# Patient Record
Sex: FEMALE | Race: WHITE | NOT HISPANIC OR LATINO | URBAN - METROPOLITAN AREA
[De-identification: names, ages, dates, MRNs, and addresses within clinical notes are randomized per-mention and may not be internally consistent; named-entity substitution may affect disease eponyms.]

---

## 2024-09-28 ENCOUNTER — OFFICE VISIT (OUTPATIENT)
Dept: URGENT CARE | Age: 69
End: 2024-09-28
Payer: MEDICARE

## 2024-09-28 VITALS
HEART RATE: 99 BPM | DIASTOLIC BLOOD PRESSURE: 61 MMHG | HEIGHT: 69 IN | BODY MASS INDEX: 23.7 KG/M2 | TEMPERATURE: 98.7 F | WEIGHT: 160 LBS | SYSTOLIC BLOOD PRESSURE: 94 MMHG | OXYGEN SATURATION: 96 %

## 2024-09-28 DIAGNOSIS — R05.1 ACUTE COUGH: ICD-10-CM

## 2024-09-28 DIAGNOSIS — U07.1 COVID: Primary | ICD-10-CM

## 2024-09-28 LAB
POC RAPID INFLUENZA A: NEGATIVE
POC RAPID INFLUENZA B: NEGATIVE
POC SARS-COV-2 AG BINAX: ABNORMAL

## 2024-09-28 RX ORDER — CETIRIZINE HYDROCHLORIDE 10 MG/1
10 TABLET ORAL DAILY
COMMUNITY

## 2024-09-28 NOTE — PROGRESS NOTES
Subjective   Patient ID: Bárbara Sullivan is a 69 y.o. female. They present today with a chief complaint of Request For Covid Testing (Flu testing as well. Symptoms started 2 days ago. ), Sore Throat, Cough, and Nasal Congestion.    History of Present Illness  HPI  Patient presents to the urgent care with a 2-day history of sore throat, sinus congestion, cough.  Patient states she received her COVID and flu vaccine 1 week ago.  Patient has never tested positive for COVID.  Patient reports being at multiple social gatherings over the past week, denies any known exposure to COVID.  Patient states her symptoms feel like the common cold.  Past Medical History  Allergies as of 09/28/2024    (No Known Allergies)       (Not in a hospital admission)       Past Medical History:   Diagnosis Date    Chronic rhinitis     Rhinitis    Encounter for gynecological examination (general) (routine) without abnormal findings 12/07/2016    Encounter for routine gynecological examination    Encounter for other screening for malignant neoplasm of breast 12/07/2016    Encounter for screening breast examination    Personal history of other diseases of the digestive system     History of esophageal reflux    Personal history of other diseases of the musculoskeletal system and connective tissue     History of arthritis       Past Surgical History:   Procedure Laterality Date    BREAST BIOPSY  10/15/2014    Biopsy Breast Open    MOUTH SURGERY  10/15/2014    Oral Surgery Tooth Extraction    TOTAL ABDOMINAL HYSTERECTOMY W/ BILATERAL SALPINGOOPHORECTOMY  10/15/2014    Total Abdominal Hysterectomy With Removal Of Both Ovaries        reports that she has never smoked. She has never been exposed to tobacco smoke. She has never used smokeless tobacco. She reports current alcohol use. She reports that she does not use drugs.    Review of Systems  Review of Systems     Patient denies fever, nausea, diarrhea, vomiting, rash, dizziness, shortness of breath,  "increased urinary frequency, chills, peripheral edema, abdominal pain, chest pain.                          Objective    Vitals:    09/28/24 1103   BP: 94/61   BP Location: Left arm   Patient Position: Sitting   BP Cuff Size: Adult   Pulse: 99   Temp: 37.1 °C (98.7 °F)   TempSrc: Oral   SpO2: 96%   Weight: 72.6 kg (160 lb)   Height: 1.753 m (5' 9\")     No LMP recorded (lmp unknown). Patient is postmenopausal.    Physical Exam  Appearance: Alert, oriented, cooperative, in no acute distress. Well nourished & well hydrated.    Skin: Intact, no lesions, rash, petechiae or purpura.     Eyes: Conjunctiva pink with no redness or exudates. Eyelids without lesions. No scleral icterus.     ENT: Hearing grossly intact. External inspection of ears without lesions or erythema. no nasal flaring. no tripoding, no drooling, no difficulty swallowing oral secretions.    Pulmonary: Clear bilaterally with good chest wall excursion. No rales, rhonchi or wheezing. No accessory muscle use or stridor. No difficulty completing a full sentence without taking a breath, no labored breathing w ambulation     Cardiac: Normal S1, S2, no rub, gallop. No JVD.    Musculoskeletal: no edema, or deformity. No cyanosis, clubbing.    Neurological: no focal findings identified.    Psychiatric: Appropriate mood and affect.    Procedures    Point of Care Test & Imaging Results from this visit  Results for orders placed or performed in visit on 09/28/24   POCT Influenza A/B manually resulted   Result Value Ref Range    POC Rapid Influenza A Negative Negative    POC Rapid Influenza B Negative Negative   POCT Covid-19 Rapid Antigen   Result Value Ref Range    POC AIDAN-COV-2 AG Positive test for SARS-CoV-2 (antigen detected) (A) Presumptive negative test for SARS-CoV-2 (no antigen detected)      No results found.    Diagnostic study results (if any) were reviewed by Leah Adorno PA-C.    Assessment/Plan   Allergies, medications, history, and pertinent " labs/EKGs/Imaging reviewed by Leah Adorno PA-C.     Medical Decision Making  Considerations for patient's symptoms include viral/bacterial infection, seasonal allergies, post-nasal drip. VSS, afebrile  Rapid COVID test positive  Rapid flu test negative  Patient will continue symptomatic treatment, discussed quarantine guidelines. If symptoms are not improving or if they are worsening the patient will call their primary care physician and go to the ER. Patient verbalizes understanding and agrees with plan of care. All questions were answered.  Dictation software was used in the creation of this note which does not evaluate or correct for typographical, spelling, syntax or grammatical errors.     Orders and Diagnoses  Diagnoses and all orders for this visit:  Acute cough  -     POCT Influenza A/B manually resulted  -     POCT Covid-19 Rapid Antigen      Medical Admin Record      Patient disposition: Home    Electronically signed by Leah Adorno PA-C  11:16 AM

## 2024-11-26 ENCOUNTER — OFFICE VISIT (OUTPATIENT)
Dept: URGENT CARE | Age: 69
End: 2024-11-26
Payer: MEDICARE

## 2024-11-26 VITALS
HEART RATE: 83 BPM | OXYGEN SATURATION: 95 % | WEIGHT: 160 LBS | DIASTOLIC BLOOD PRESSURE: 65 MMHG | SYSTOLIC BLOOD PRESSURE: 101 MMHG | HEIGHT: 69 IN | TEMPERATURE: 97.7 F | BODY MASS INDEX: 23.7 KG/M2

## 2024-11-26 DIAGNOSIS — R09.81 NASAL CONGESTION: ICD-10-CM

## 2024-11-26 DIAGNOSIS — J01.00 ACUTE NON-RECURRENT MAXILLARY SINUSITIS: Primary | ICD-10-CM

## 2024-11-26 LAB — POC SARS-COV-2 AG BINAX: NORMAL

## 2024-11-26 RX ORDER — AMOXICILLIN AND CLAVULANATE POTASSIUM 875; 125 MG/1; MG/1
1 TABLET, FILM COATED ORAL 2 TIMES DAILY
Qty: 20 TABLET | Refills: 0 | Status: SHIPPED | OUTPATIENT
Start: 2024-11-26 | End: 2024-12-06

## 2024-11-26 ASSESSMENT — ENCOUNTER SYMPTOMS
EYES NEGATIVE: 1
CONSTITUTIONAL NEGATIVE: 1
COUGH: 1
SINUS PRESSURE: 1
SINUS PAIN: 1

## 2024-11-26 NOTE — PROGRESS NOTES
"Subjective   Patient ID: Bárbara Sullivan is a 69 y.o. female. They present today with a chief complaint of Nasal Congestion (Symptoms for about a week.) and Sinus Problem.    History of Present Illness  HPI    Past Medical History  Allergies as of 11/26/2024    (No Known Allergies)       (Not in a hospital admission)       Past Medical History:   Diagnosis Date    Chronic rhinitis     Rhinitis    Encounter for gynecological examination (general) (routine) without abnormal findings 12/07/2016    Encounter for routine gynecological examination    Encounter for other screening for malignant neoplasm of breast 12/07/2016    Encounter for screening breast examination    Personal history of other diseases of the digestive system     History of esophageal reflux    Personal history of other diseases of the musculoskeletal system and connective tissue     History of arthritis       Past Surgical History:   Procedure Laterality Date    BREAST BIOPSY  10/15/2014    Biopsy Breast Open    MOUTH SURGERY  10/15/2014    Oral Surgery Tooth Extraction    TOTAL ABDOMINAL HYSTERECTOMY W/ BILATERAL SALPINGOOPHORECTOMY  10/15/2014    Total Abdominal Hysterectomy With Removal Of Both Ovaries        reports that she has never smoked. She has never been exposed to tobacco smoke. She has never used smokeless tobacco. She reports current alcohol use. She reports that she does not use drugs.    Review of Systems  Review of Systems   Constitutional: Negative.    HENT:  Positive for congestion, sinus pressure and sinus pain.    Eyes: Negative.    Respiratory:  Positive for cough.                                   Objective    Vitals:    11/26/24 1424   BP: 101/65   BP Location: Right arm   Patient Position: Sitting   BP Cuff Size: Adult   Pulse: 83   Temp: 36.5 °C (97.7 °F)   TempSrc: Oral   SpO2: 95%   Weight: 72.6 kg (160 lb)   Height: 1.753 m (5' 9\")     No LMP recorded (lmp unknown). Patient is postmenopausal.    Physical Exam  Constitutional: "       Appearance: Normal appearance.   HENT:      Right Ear: Tympanic membrane normal.      Left Ear: Tympanic membrane is bulging.      Nose:      Right Sinus: Maxillary sinus tenderness present.      Mouth/Throat:      Pharynx: Posterior oropharyngeal erythema and postnasal drip present.   Pulmonary:      Effort: Pulmonary effort is normal.      Breath sounds: Normal breath sounds and air entry.   Neurological:      Mental Status: She is alert.         Procedures    Point of Care Test & Imaging Results from this visit  Results for orders placed or performed in visit on 11/26/24   POCT Covid-19 Rapid Antigen   Result Value Ref Range    POC AIDAN-COV-2 AG  Presumptive negative test for SARS-CoV-2 (no antigen detected)     Presumptive negative test for SARS-CoV-2 (no antigen detected)      No results found.    Diagnostic study results (if any) were reviewed by Sahara Aguilera MD.    Assessment/Plan   Allergies, medications, history, and pertinent labs/EKGs/Imaging reviewed by Sahara Aguilera MD.     Medical Decision Making      Orders and Diagnoses  Diagnoses and all orders for this visit:  Nasal congestion  -     POCT Covid-19 Rapid Antigen      Medical Admin Record      Patient disposition: Home    Electronically signed by Sahara Aguilera MD  2:49 PM

## 2025-01-27 ENCOUNTER — OFFICE VISIT (OUTPATIENT)
Dept: URGENT CARE | Age: 70
End: 2025-01-27
Payer: MEDICARE

## 2025-01-27 VITALS
WEIGHT: 160 LBS | HEART RATE: 77 BPM | DIASTOLIC BLOOD PRESSURE: 68 MMHG | TEMPERATURE: 98.3 F | OXYGEN SATURATION: 97 % | BODY MASS INDEX: 23.63 KG/M2 | SYSTOLIC BLOOD PRESSURE: 105 MMHG | RESPIRATION RATE: 16 BRPM

## 2025-01-27 DIAGNOSIS — J01.00 ACUTE NON-RECURRENT MAXILLARY SINUSITIS: Primary | ICD-10-CM

## 2025-01-27 PROCEDURE — 1125F AMNT PAIN NOTED PAIN PRSNT: CPT | Performed by: NURSE PRACTITIONER

## 2025-01-27 PROCEDURE — 99070 SPECIAL SUPPLIES PHYS/QHP: CPT | Performed by: NURSE PRACTITIONER

## 2025-01-27 PROCEDURE — 99213 OFFICE O/P EST LOW 20 MIN: CPT | Performed by: NURSE PRACTITIONER

## 2025-01-27 RX ORDER — AMOXICILLIN AND CLAVULANATE POTASSIUM 875; 125 MG/1; MG/1
875 TABLET, FILM COATED ORAL 2 TIMES DAILY
Qty: 20 TABLET | Refills: 0 | Status: SHIPPED | OUTPATIENT
Start: 2025-01-27

## 2025-01-27 ASSESSMENT — ENCOUNTER SYMPTOMS
SINUS PAIN: 1
SINUS PRESSURE: 1

## 2025-01-27 ASSESSMENT — PAIN SCALES - GENERAL: PAINLEVEL_OUTOF10: 4

## 2025-01-27 NOTE — PROGRESS NOTES
Subjective   Patient ID: Bárbara Sullivan is a 69 y.o. female. They present today with a chief complaint of Sinus Problem (Sinus pressure under eye and in head x 1 week), Cough (Post nasal drip x 1 week), and Fatigue.    History of Present Illness  Bárbara Sullivan is a 69 y.o. female who presents to the clinic for 7 days of sinus pressure, sinus pain, right ear fullness.  Patient states she has taken Zicam, Mucinex D with no relief..   Pt denies any chest pain, sob, N/V at this time in clinic.             Past Medical History  Allergies as of 01/27/2025    (No Known Allergies)       (Not in a hospital admission)       Past Medical History:   Diagnosis Date    Chronic rhinitis     Rhinitis    Encounter for gynecological examination (general) (routine) without abnormal findings 12/07/2016    Encounter for routine gynecological examination    Encounter for other screening for malignant neoplasm of breast 12/07/2016    Encounter for screening breast examination    Personal history of other diseases of the digestive system     History of esophageal reflux    Personal history of other diseases of the musculoskeletal system and connective tissue     History of arthritis       Past Surgical History:   Procedure Laterality Date    BREAST BIOPSY  10/15/2014    Biopsy Breast Open    MOUTH SURGERY  10/15/2014    Oral Surgery Tooth Extraction    TOTAL ABDOMINAL HYSTERECTOMY W/ BILATERAL SALPINGOOPHORECTOMY  10/15/2014    Total Abdominal Hysterectomy With Removal Of Both Ovaries        reports that she has never smoked. She has never been exposed to tobacco smoke. She has never used smokeless tobacco. She reports current alcohol use. She reports that she does not use drugs.    Review of Systems  Review of Systems   HENT:  Positive for congestion, ear pain, sinus pressure and sinus pain.    All other systems reviewed and are negative.                                 Objective    Vitals:    01/27/25 1222   BP: 105/68   Pulse: 77   Resp:  16   Temp: 36.8 °C (98.3 °F)   TempSrc: Oral   SpO2: 97%   Weight: 72.6 kg (160 lb)     No LMP recorded (lmp unknown). Patient is postmenopausal.    Physical Exam  Constitutional:       Appearance: Normal appearance.   HENT:      Right Ear: Tympanic membrane normal.      Left Ear: Tympanic membrane normal.      Nose:      Right Sinus: Maxillary sinus tenderness and frontal sinus tenderness present.      Left Sinus: No maxillary sinus tenderness or frontal sinus tenderness.   Cardiovascular:      Rate and Rhythm: Normal rate and regular rhythm.   Pulmonary:      Effort: Pulmonary effort is normal.      Breath sounds: Normal breath sounds.   Neurological:      General: No focal deficit present.      Mental Status: She is alert and oriented to person, place, and time. Mental status is at baseline.   Psychiatric:         Mood and Affect: Mood normal.         Behavior: Behavior normal.         Procedures    Point of Care Test & Imaging Results from this visit  No results found for this visit on 01/27/25.   No results found.    Diagnostic study results (if any) were reviewed by BRIA Gonzalez.    Assessment/Plan   Allergies, medications, history, and pertinent labs/EKGs/Imaging reviewed by ABDIAZIZ Gonzalez-CNP.     Medical Decision Making  History and examination consistent with acute uncomplicated sinusitis. No indication for labs or imaging at this time. No evidence of sepsis, strep pharyngitis, or pneumonia. Counseled patient/family on antibiotic treatment and supportive measures at home. Patient advised to return to clinic or present to ED if symptoms change or worsen. Otherwise follow-up with PCP. Patient verbalized understanding and agrees with plan.       Orders and Diagnoses  Diagnoses and all orders for this visit:  Acute non-recurrent maxillary sinusitis  -     amoxicillin-pot clavulanate (Augmentin) 875-125 mg tablet; Take 1 tablet (875 mg) by mouth 2 times a day.      Medical Admin  Record      Patient disposition: Home    Electronically signed by Braulio Neil, APRN-CNP  12:51 PM

## 2025-06-15 ENCOUNTER — OFFICE VISIT (OUTPATIENT)
Dept: URGENT CARE | Age: 70
End: 2025-06-15
Payer: MEDICARE

## 2025-06-15 VITALS
SYSTOLIC BLOOD PRESSURE: 121 MMHG | OXYGEN SATURATION: 96 % | BODY MASS INDEX: 23.7 KG/M2 | TEMPERATURE: 97.8 F | DIASTOLIC BLOOD PRESSURE: 71 MMHG | RESPIRATION RATE: 17 BRPM | HEART RATE: 85 BPM | HEIGHT: 69 IN | WEIGHT: 160 LBS

## 2025-06-15 DIAGNOSIS — J01.90 ACUTE BACTERIAL SINUSITIS: Primary | ICD-10-CM

## 2025-06-15 DIAGNOSIS — B96.89 ACUTE BACTERIAL SINUSITIS: Primary | ICD-10-CM

## 2025-06-15 RX ORDER — AMOXICILLIN AND CLAVULANATE POTASSIUM 875; 125 MG/1; MG/1
875 TABLET, FILM COATED ORAL 2 TIMES DAILY
Qty: 20 TABLET | Refills: 0 | Status: SHIPPED | OUTPATIENT
Start: 2025-06-15

## 2025-06-15 ASSESSMENT — ENCOUNTER SYMPTOMS
COUGH: 1
LOSS OF SENSATION IN FEET: 0
RHINORRHEA: 1
SINUS PAIN: 1
DEPRESSION: 0
FATIGUE: 1
OCCASIONAL FEELINGS OF UNSTEADINESS: 0
SINUS PRESSURE: 1

## 2025-06-15 ASSESSMENT — PATIENT HEALTH QUESTIONNAIRE - PHQ9
1. LITTLE INTEREST OR PLEASURE IN DOING THINGS: NOT AT ALL
SUM OF ALL RESPONSES TO PHQ9 QUESTIONS 1 AND 2: 0
2. FEELING DOWN, DEPRESSED OR HOPELESS: NOT AT ALL

## 2025-06-15 NOTE — PATIENT INSTRUCTIONS
Augmentin 1 tablet 2 times a day for 10 days.  Please take over-the-counter Zyrtec.  Please take as directed on label.  Please take for 10 days.  Please take over-the-counter probiotic tablet or increase yogurt consumption for the next 10 days.  Please call your primary care doctor next 5 to 7 days.  If worsening symptoms, please go to local emergency department for further evaluation.    Please follow up with your primary provider within one week if symptoms do not improve.  You may schedule an appointment online at [email or web address] or call [phone]. Go to the Emergency Department if symptoms significantly worsen or if you develop chest pain or shortness of breath.

## 2025-06-15 NOTE — PROGRESS NOTES
"Subjective   Patient ID: Bárbara Sullivan is a 69 y.o. female. They present today with a chief complaint of Sinusitis (Possible sinus infection, sore throat, headache ongoing for 8 days not getting better).    History of Present Illness  Bárbara Sullivan is a 69 y.o. female who presents to the clinic for 8 days of sinus pressure, sinus pain, intermittent cough, rhinorrhea, postnasal drip, sore throat, fatigue. Pt denies any chest pain, sob, N/V at this time in clinic.             Past Medical History  Allergies as of 06/15/2025    (No Known Allergies)       Prescriptions Prior to Admission[1]     Medical History[2]    Surgical History[3]     reports that she has never smoked. She has never been exposed to tobacco smoke. She has never used smokeless tobacco. She reports current alcohol use. She reports that she does not use drugs.    Review of Systems  Review of Systems   Constitutional:  Positive for fatigue.   HENT:  Positive for congestion, postnasal drip, rhinorrhea, sinus pressure and sinus pain.    Respiratory:  Positive for cough.    All other systems reviewed and are negative.                                 Objective    Vitals:    06/15/25 1055   BP: 121/71   BP Location: Left arm   Patient Position: Sitting   BP Cuff Size: Adult   Pulse: 85   Resp: 17   Temp: 36.6 °C (97.8 °F)   TempSrc: Oral   SpO2: 96%   Weight: 72.6 kg (160 lb)   Height: 1.753 m (5' 9\")     No LMP recorded (lmp unknown). Patient is postmenopausal.    Physical Exam  Constitutional:       Appearance: Normal appearance.   HENT:      Right Ear: Tympanic membrane normal.      Left Ear: Tympanic membrane normal.      Nose:      Right Sinus: No maxillary sinus tenderness or frontal sinus tenderness.      Left Sinus: Maxillary sinus tenderness and frontal sinus tenderness present.   Cardiovascular:      Rate and Rhythm: Normal rate and regular rhythm.   Pulmonary:      Effort: Pulmonary effort is normal.      Breath sounds: Normal breath sounds. "   Neurological:      General: No focal deficit present.      Mental Status: She is alert and oriented to person, place, and time. Mental status is at baseline.   Psychiatric:         Mood and Affect: Mood normal.         Behavior: Behavior normal.         Procedures    Point of Care Test & Imaging Results from this visit  No results found for this visit on 06/15/25.   Imaging  No results found.    Cardiology, Vascular, and Other Imaging  No other imaging results found for the past 2 days      Diagnostic study results (if any) were reviewed by BRIA Gonzalez.    Assessment/Plan   Allergies, medications, history, and pertinent labs/EKGs/Imaging reviewed by BRIA Gonzalez.     Medical Decision Making  - History and examination consistent with acute uncomplicated sinusitis. No indication for labs or imaging at this time. No evidence of sepsis, strep pharyngitis, or pneumonia. Counseled patient/family on antibiotic treatment and supportive measures at home. Patient advised to return to clinic or present to ED if symptoms change or worsen. Otherwise follow-up with PCP. Patient verbalized understanding and agrees with plan.       Orders and Diagnoses  Diagnoses and all orders for this visit:  Acute bacterial sinusitis  -     amoxicillin-clavulanate (Augmentin) 875-125 mg tablet; Take 1 tablet (875 mg of amoxicillin) by mouth 2 times a day.      Medical Admin Record      Patient disposition: Home    Electronically signed by BRIA Gonzalez  11:27 AM           [1] (Not in a hospital admission)   [2]   Past Medical History:  Diagnosis Date    Chronic rhinitis     Rhinitis    Encounter for gynecological examination (general) (routine) without abnormal findings 12/07/2016    Encounter for routine gynecological examination    Encounter for other screening for malignant neoplasm of breast 12/07/2016    Encounter for screening breast examination    Personal history of other diseases of the digestive  system     History of esophageal reflux    Personal history of other diseases of the musculoskeletal system and connective tissue     History of arthritis   [3]   Past Surgical History:  Procedure Laterality Date    BREAST BIOPSY  10/15/2014    Biopsy Breast Open    MOUTH SURGERY  10/15/2014    Oral Surgery Tooth Extraction    TOTAL ABDOMINAL HYSTERECTOMY W/ BILATERAL SALPINGOOPHORECTOMY  10/15/2014    Total Abdominal Hysterectomy With Removal Of Both Ovaries